# Patient Record
Sex: MALE | Race: WHITE
[De-identification: names, ages, dates, MRNs, and addresses within clinical notes are randomized per-mention and may not be internally consistent; named-entity substitution may affect disease eponyms.]

---

## 2020-06-22 ENCOUNTER — NURSE TRIAGE (OUTPATIENT)
Dept: OTHER | Facility: CLINIC | Age: 25
End: 2020-06-22

## 2020-06-22 NOTE — TELEPHONE ENCOUNTER
Reason for Disposition   Moving the earlobe or touching the ear clearly increases the pain   All other earaches (Exceptions: earache lasting < 1 hour, and earache from air travel)   Decreased hearing (or another adult says that the ear canal is completely blocked with discharge)    Answer Assessment - Initial Assessment Questions  1. LOCATION: \"Which ear is involved? \"      right  2. ONSET: \"When did the ear start hurting\"       6/21/20  3. SEVERITY: \"How bad is the pain? \"  (Scale 1-10; mild, moderate or severe)    - MILD (1-3): doesn't interfere with normal activities     - MODERATE (4-7): interferes with normal activities or awakens from sleep     - SEVERE (8-10): excruciating pain, unable to do any normal activities       2/10  4. URI SYMPTOMS: \" Do you have a runny nose or cough? \"      no  5. FEVER: \"Do you have a fever? \" If so, ask: \"What is your temperature, how was it measured, and when did it start? \"      No  6. CAUSE: \"Have you been swimming recently? \", \"How often do you use Q-TIPS? \", \"Have you had any recent air travel or scuba diving? \"      q-tips often  7. OTHER SYMPTOMS: \"Do you have any other symptoms? \" (e.g., headache, stiff neck, dizziness, vomiting, runny nose, decreased hearing)     Muffled hearing  8. PREGNANCY: \"Is there any chance you are pregnant? \" \"When was your last menstrual period? \"      No    Protocols used: EARACHE-ADULT-[place], EAR - SWIMMER'S - OTITIS EXTERNA-ADULT-[place]    Patient called in via the [address] to report symptoms of right ear pain, discharge and muffled hearing. Pt states symptoms started last night. Rates the pain a 2/10. Reports using Q-tips often. Describes discharge as brown in color. Denies fevers or URI symptoms at this time. Patient informed of disposition. Care advice as documented. Instructed patient to call back with worsening symptoms. Patient verbalized understanding and denies any further questions/concerns.     Please do not respond to the

## 2022-11-08 LAB
CHOLESTEROL/HDL RATIO: 2.4
CHOLESTEROL: 177 MG/DL (ref 0–199)
HDLC SERPL-MCNC: 74.3 MG/DL
LDL CHOLESTEROL: 97 MG/DL (ref 0–99)
TRIGL SERPL-MCNC: 31 MG/DL (ref 0–149)
VLDLC SERPL CALC-MCNC: 6 MG/DL (ref 0–40)

## 2022-11-09 LAB
ESTIMATED AVERAGE GLUCOSE: 105 MG/DL
HBA1C MFR BLD: 5.3 %

## 2022-11-15 LAB
TESTOSTERONE FREE: 71.1 PG/ML (ref 35–155)
TESTOSTERONE TOTAL-MALE: 393 NG/DL (ref 250–1100)

## 2022-12-25 ENCOUNTER — NURSE TRIAGE (OUTPATIENT)
Dept: OTHER | Facility: CLINIC | Age: 27
End: 2022-12-25

## 2022-12-25 NOTE — TELEPHONE ENCOUNTER
Location of patient: [place]    Subjective: Caller states \"been sick for 8 days but feel worse and feel like it might be getting worse. Would like to use telehealth as much as possible. \"  3 negative Covid tests t/o week. Current Symptoms: chest has fluid in lungs and almost impossible to get out, productive cough, severe sinus congestion, short of breath with exertion. Onset: 8 days ago; worsening    Associated Symptoms: reduced appetite    Pain Severity: 0/10; N/A; none    Temperature: no longer, but did have one yesterday    What has been tried: Mucinex, Dayquil, Nyquil    LMP: NA Pregnant: NA    Recommended disposition: Go to [facility]    Care advice provided, patient verbalizes understanding; denies any other questions or concerns; instructed to call back for any new or worsening symptoms. Patient/caller warm transferred to [facility] at [facility] for appointment scheduling. Niyah Cindy that he would need to go [place] [facility] [place] as [facility] said they are starting to book up and she would do her best to get him scheduled today. This triage is a result of a call to [address] [place] [address]. Please do not respond to the triage nurse through this encounter. Any subsequent communication should be directly with the patient.     Reason for Disposition   MILD difficulty breathing (e.g., minimal/no SOB at rest, SOB with walking, pulse < 100) of new-onset or worse than normal    Protocols used: Breathing Difficulty-ADULT-[place]

## 2025-04-11 ENCOUNTER — TELEPHONE (OUTPATIENT)
Facility: CLINIC | Age: 30
End: 2025-04-11

## 2025-04-11 DIAGNOSIS — R53.83 OTHER FATIGUE: Primary | ICD-10-CM

## 2025-04-11 NOTE — TELEPHONE ENCOUNTER
Pt was last seen 02/02/2023 and he is still symptomatic and he is requesting to have blood work done to check his testosterone levels to see if he should make an appointment. Is this something you would order now?

## 2025-04-17 LAB
TESTOST FREE SERPL-MCNC: 74.6 PG/ML (ref 35–155)
TESTOST SERPL-MCNC: 497 NG/DL (ref 250–1100)